# Patient Record
(demographics unavailable — no encounter records)

---

## 2025-04-05 NOTE — HISTORY OF PRESENT ILLNESS
[FreeTextEntry1] : Seen in er 3/28/25 and told she was miscarrying. Light bleeding since then.  Denies n/v/abd pain. [PGHxTotal] : 3 [PGHxFullTerm] : 0 [PGHxPremature] : 0 [PGHxAbortions] : 3 [[facility]] : 0 [PGxABSpont] : 3 [Regular Cycle Intervals] : periods have been regular [Frequency: Q ___ days] : menstrual periods occur approximately every [unfilled] days [Menarche Age: ____] : age at menarche was [unfilled] [Currently Active] : currently active [Men] : men [Vaginal] : vaginal

## 2025-04-11 NOTE — PLAN
[FreeTextEntry1] : Likely complete . BHCG drawn to confirm.  Prediabetes, discussed diet exercise and weight loss.  Discussed birth control options. Combined estrogen and progesterone OCP, ring, patch. Risk reviewed including Blood Clots (DVT and PE), Migraines, High Blood Pressure, Heart Attack, Stroke or Unplanned Pregnancy discussed (failure). Non hormonal Barrier - condoms and paragard. Progesterone only Mirena, Nexplanon, minipill, depoprovera.

## 2025-04-11 NOTE — HISTORY OF PRESENT ILLNESS
[FreeTextEntry1] : No pelvic pain or bleeding.  Concerned she had so little bleeding with miscarriage.

## 2025-05-24 NOTE — PLAN
[FreeTextEntry1] : Hypercoaguability evaluation for multiple miscarriages. Blood drawn.  Prediabetes - diet, exercise and weight loss

## 2025-06-06 NOTE — PLAN
[FreeTextEntry1] : Antithrombin 3 def.  Will repeat assay today. Discussed risk of dvt pe and miscarriage.  Prediabetes - discussed diet weight loss and exercise.  Complications of uncontrolled dm over time.  Culture done, likely BV.

## 2025-06-06 NOTE — HISTORY OF PRESENT ILLNESS
[FreeTextEntry1] : C/o discharge with itching and fishy odor x 1 week.  Yellow thick discharge.  In to review results.

## 2025-07-11 NOTE — PHYSICAL EXAM
[Appropriately responsive] : appropriately responsive [Alert] : alert [No Acute Distress] : no acute distress [Labia Majora] : normal [Labia Minora] : normal [Discharge] : a  ~M vaginal discharge was present [Moderate] : moderate [Foul Smelling] : foul smelling [Normal] : normal [Uterine Adnexae] : normal

## 2025-07-11 NOTE — PROCEDURE
[Liquid Base] : liquid base [General Vaginal Culture] : general vaginal culture [Tolerated Well] : the patient tolerated the procedure well [No Complications] : there were no complications

## 2025-07-12 NOTE — DISCUSSION/SUMMARY
[FreeTextEntry1] : Chlamydia is a STI that is passed during sexual intercourse Sexual partner or partners should be treated and tested for other STIs Avoid sex with until both her and her partner are cured Use of condoms decrease chances of spreading and reinfection Follow up appt for 1 week

## 2025-07-12 NOTE — HISTORY OF PRESENT ILLNESS
[Y] : Patient is sexually active [Monogamous (Male Partner)] : is monogamous with a male partner [Patient would like to be screened for STIs] : Patient would like to be screened for STIs [FreeTextEntry1] : 30 yo presents for test of cure after dx and treatment of Chlamydia.  Patient states she took the medication as prescribed, but she did have intercourse with her untreated . [LMPDate] : 6/22/25

## 2025-07-12 NOTE — HISTORY OF PRESENT ILLNESS
[Y] : Patient is sexually active [Monogamous (Male Partner)] : is monogamous with a male partner [Patient would like to be screened for STIs] : Patient would like to be screened for STIs [FreeTextEntry1] : 28 yo presents for test of cure after dx and treatment of Chlamydia.  Patient states she took the medication as prescribed, but she did have intercourse with her untreated . [LMPDate] : 6/22/25

## 2025-07-18 NOTE — HISTORY OF PRESENT ILLNESS
[FreeTextEntry1] : In to review rpt chlamydia testing positive. Previously treated with azithro  Partner was never treated. Denies symptoms today.

## 2025-07-18 NOTE — PLAN
[FreeTextEntry1] : An STI is an infection passes during sexual intercourse. Sexual partner or partners should be treated and tested for other STIs Avoid sex until you and your partner have been treated. Use of condoms decrease chances of infection and transmission.  Stressed need to treat partner before resuming sexual activity.